# Patient Record
Sex: MALE | Employment: FULL TIME | URBAN - METROPOLITAN AREA
[De-identification: names, ages, dates, MRNs, and addresses within clinical notes are randomized per-mention and may not be internally consistent; named-entity substitution may affect disease eponyms.]

---

## 2020-11-03 ENCOUNTER — OFFICE VISIT (OUTPATIENT)
Dept: FAMILY MEDICINE | Facility: CLINIC | Age: 40
End: 2020-11-03
Payer: COMMERCIAL

## 2020-11-03 VITALS
OXYGEN SATURATION: 96 % | WEIGHT: 211.5 LBS | SYSTOLIC BLOOD PRESSURE: 104 MMHG | BODY MASS INDEX: 33.13 KG/M2 | DIASTOLIC BLOOD PRESSURE: 68 MMHG | TEMPERATURE: 99.2 F | RESPIRATION RATE: 14 BRPM | HEART RATE: 73 BPM

## 2020-11-03 DIAGNOSIS — Z13.6 CARDIOVASCULAR SCREENING; LDL GOAL LESS THAN 160: ICD-10-CM

## 2020-11-03 DIAGNOSIS — Z13.1 SCREENING FOR DIABETES MELLITUS: ICD-10-CM

## 2020-11-03 DIAGNOSIS — F43.21 GRIEF REACTION: Primary | ICD-10-CM

## 2020-11-03 DIAGNOSIS — Z80.7 FAMILY HISTORY OF LYMPHOMA: ICD-10-CM

## 2020-11-03 DIAGNOSIS — R06.01 ORTHOPNEA: ICD-10-CM

## 2020-11-03 PROCEDURE — 99204 OFFICE O/P NEW MOD 45 MIN: CPT | Performed by: PHYSICIAN ASSISTANT

## 2020-11-03 RX ORDER — ALBUTEROL SULFATE 90 UG/1
2 AEROSOL, METERED RESPIRATORY (INHALATION) EVERY 6 HOURS
COMMUNITY

## 2020-11-03 NOTE — PROGRESS NOTES
"Subjective     Bernardo Swain is a 39 year old male who presents to clinic today for the following health issues:    HPI         Concern - Sleep in general:   Onset: an issue 2 years.   Description: not sleeping well, pt states they get starttled from being woken up, or waking alone. \"almost like I gasp for air when I wake up.\" Others have also noticed, causing pt to come in and get started on what could be going on.   Progression of Symptoms:  Worsening- also experiencing heat burn.   Accompanying Signs & Symptoms: nothing   Previous history of similar problem: no previous evaluation or similar problem.        Improved by: has not tried much.   Therapies tried and outcome: nothing tried by pt.     Also with some snoring that disrupts others    Reports he had an unexpected death in the family as well  His older brother, he was closest to him. They were coworkers. Passed away in his sleep, they are still waiting on autopsy report    Going through a divorce     The last few weeks he's been waking up startled at night  In the past he was waking up because he gasps for air    He also gets strep but also still has his tonsils    Wakes up feeling really hot at night as well        Review of Systems   CONSTITUTIONAL: NEGATIVE for fever, chills, change in weight  INTEGUMENTARY/SKIN: NEGATIVE for worrisome rashes, moles or lesions  EYES: NEGATIVE for vision changes or irritation  ENT/MOUTH: NEGATIVE for ear, mouth and throat problems  RESP: NEGATIVE for significant cough or SOB  BREAST: NEGATIVE for masses, tenderness or discharge  CV: NEGATIVE for chest pain, palpitations or peripheral edema  GI: NEGATIVE for nausea, abdominal pain, heartburn, or change in bowel habits  : NEGATIVE for frequency, dysuria, or hematuria  MUSCULOSKELETAL: NEGATIVE for significant arthralgias or myalgia  NEURO: NEGATIVE for weakness, dizziness or paresthesias  ENDOCRINE: NEGATIVE for temperature intolerance, skin/hair changes  HEME: NEGATIVE " for bleeding problems  PSYCHIATRIC: NEGATIVE fordelusions, hallucinations, thoughts of hurting someone else and thoughts of self harm      Objective    /68   Pulse 73   Temp 99.2  F (37.3  C) (Temporal)   Resp 14   Wt 95.9 kg (211 lb 8 oz)   SpO2 96%   BMI 33.13 kg/m    Body mass index is 33.13 kg/m .  Physical Exam   GENERAL: healthy, alert and no distress  EYES: Eyes grossly normal to inspection, PERRL and conjunctivae and sclerae normal  HENT: ear canals and TM's normal, nose and mouth without ulcers or lesions  NECK: no adenopathy, no asymmetry, masses, or scars and thyroid normal to palpation  RESP: lungs clear to auscultation - no rales, rhonchi or wheezes  CV: regular rate and rhythm, normal S1 S2, no S3 or S4, no murmur, click or rub, no peripheral edema and peripheral pulses strong  MS: no gross musculoskeletal defects noted, no edema  SKIN: no suspicious lesions or rashes  NEURO: Normal strength and tone, mentation intact and speech normal  PSYCH: mentation appears normal, affect blunted              ICD-10-CM    1. Grief reaction  F43.21 MENTAL HEALTH REFERRAL  - Adult; Outpatient Treatment; Individual/Couples/Family/Group Therapy/Health Psychology; [facility]: [facility] [phone]; We will contact you to schedule the appointment or please call with any questions   2. Orthopnea  R06.01 SLEEP EVALUATION & MANAGEMENT REFERRAL - ADULT -[facility] [phone] (Age 15 and up)   3. CARDIOVASCULAR SCREENING; LDL GOAL LESS THAN 160  Z13.6 Lipid panel reflex to direct LDL Fasting   4. Family history of lymphoma  Z80.7 **CBC with platelets FUTURE anytime   5. Screening for diabetes mellitus  Z13.1 Glucose     Patient Instructions   [employer] Booking  Follow up with sleep psychologist  Schedule fasting labs  Return to clinic for any new or worsening symptoms or go to ER Urgent care in off hours

## 2020-11-03 NOTE — PATIENT INSTRUCTIONS
[email or web address]  Follow up with sleep psychologist  Schedule fasting labs  Return to clinic for any new or worsening symptoms or go to ER Urgent care in off hours

## 2020-11-04 RX ORDER — ALBUTEROL SULFATE 90 UG/1
2 AEROSOL, METERED RESPIRATORY (INHALATION)
COMMUNITY
Start: 2018-11-26

## 2020-11-04 RX ORDER — FLUTICASONE PROPIONATE 50 MCG
SPRAY, SUSPENSION (ML) NASAL
COMMUNITY
Start: 2020-04-24

## 2020-11-04 NOTE — PROGRESS NOTES
"Bernardo Swain is a 39 year old male who is being evaluated via a billable video visit.      The patient has been notified of following:     \"This video visit will be conducted via a call between you and your physician/provider. We have found that certain health care needs can be provided without the need for an in-person physical exam.  This service lets us provide the care you need with a video conversation.  If a prescription is necessary we can send it directly to your pharmacy.  If lab work is needed we can place an order for that and you can then stop by our lab to have the test done at a later time.    Video visits are billed at different rates depending on your insurance coverage.  Please reach out to your insurance provider with any questions.    If during the course of the call the physician/provider feels a video visit is not appropriate, you will not be charged for this service.\"    Patient has given verbal consent for Video visit? Yes  How would you like to obtain your AVS? Mail a copy  If you are dropped from the video visit, the video invite should be resent to: Text to cell phone: [phone]      Video-Visit Details    Type of service:  Video Visit    Video Start Time: 3:40 PM  Video End Time: 3:59 PM    Originating Location (pt. Location): Home    Distant Location (provider location):  [facility]     Platform used for Video Visit: Ivan Toth MD    Sleep Consultation:    Date on this visit: 11/5/2020    Bernardo Swain is sent by Beatris Aguirre for a sleep consultation regarding possible sleep apnea.    Primary Physician: No Ref-Primary, Physician     Chief complaint: snoring, gasping episodes in sleep     Presenting History:     Bernardo Swain reports nightly snoring and poor quality of sleep for more than 2 years.     Bernardo goes to sleep at 12:00 AM during the week. He wakes up at 7:00 AM without an alarm. He falls asleep in 15 minutes.  " Bernardo denies difficulty falling asleep.  He wakes up 1-2 times a night for 10 minutes before falling back to sleep.  Bernardo wakes up to uncertain reasons and snort arousals.  On weekends, Bernardo goes to sleep at 12:00 AM.  He wakes up at 7:00 AM without an alarm. He falls asleep in 15 minutes.  Patient gets an average of 7 hours of sleep per night.     Bernardo does snore every night. Patient does not have a regular bed partner. There is report of snoring, gasping, snorting and poor quality of sleep.  He does have witnessed apneas. Patient sleeps on his side and stomach. He has occasional morning headaches, denies no restless legs. Bernardo denies any bruxism, sleep walking, sleep talking, dream enactment, sleep paralysis, cataplexy and hypnogogic/hypnopompic hallucinations.    Patient's [place] Sleepiness score 8/24 consistent with no daytime sleepiness.      Bernardo naps 0-1 times per week for 20-30 minutes, feels refreshed after naps. He takes no inadvertant naps.  He denies closing eyes, dozing and falling asleep while driving. Patient was counseled on the importance of driving while alert, to pull over if drowsy, or nap before getting into the vehicle if sleepy.      He uses 2 cups/day of coffee. Last caffeine intake is usually before noon.    Allergies:    Allergies   Allergen Reactions     Latex Itching     Seasonal Allergies        Medications:    Current Outpatient Medications   Medication Sig Dispense Refill     albuterol (PROAIR HFA/PROVENTIL HFA/VENTOLIN HFA) 108 (90 Base) MCG/ACT inhaler Inhale 2 puffs into the lungs       albuterol (PROAIR HFA/PROVENTIL HFA/VENTOLIN HFA) 108 (90 Base) MCG/ACT inhaler Inhale 2 puffs into the lungs every 6 hours       fluticasone (FLONASE) 50 MCG/ACT nasal spray        Fluticasone Propionate (FLONASE NA)        mometasone-formoterol (DULERA) 200-5 MCG/ACT oral inhaler          Problem List:  Patient Active Problem List    Diagnosis Date Noted     Chronic pain of right  knee 08/04/2016     Priority: Medium        Past Medical/Surgical History:  Past Medical History:   Diagnosis Date     Heart burn      Uncomplicated asthma      No past surgical history on file.    Social History:  Social History     Socioeconomic History     Marital status: Single     Spouse name: Not on file     Number of children: Not on file     Years of education: Not on file     Highest education level: Not on file   Occupational History     Not on file   Social Needs     Financial resource strain: Not on file     Food insecurity     Worry: Not on file     Inability: Not on file     Transportation needs     Medical: Not on file     Non-medical: Not on file   Tobacco Use     Smoking status: Never Smoker     Smokeless tobacco: Current User     Types: Chew     Tobacco comment: OCCAS/ONCE A YEAR   Substance and Sexual Activity     Alcohol use: Yes     Comment: 1 drink a day      Drug use: Never     Sexual activity: Yes     Partners: Female   Lifestyle     Physical activity     Days per week: Not on file     Minutes per session: Not on file     Stress: Not on file   Relationships     Social connections     Talks on phone: Not on file     Gets together: Not on file     Attends Denominational service: Not on file     Active member of club or organization: Not on file     Attends meetings of clubs or organizations: Not on file     Relationship status: Not on file     Intimate partner violence     Fear of current or ex partner: Not on file     Emotionally abused: Not on file     Physically abused: Not on file     Forced sexual activity: Not on file   Other Topics Concern     Not on file   Social History Narrative     Not on file       Family History:  Family History   Problem Relation Age of Onset     Lymphoma Mother      Lymphoma Father        Review of Systems:  A complete review of systems reviewed by me is negative with the exeption of what has been mentioned in the history of present illness.  CONSTITUTIONAL:  POSITIVE  for  night sweats  EYES: NEGATIVE for changes in vision, blind spots, double vision.  ENT: NEGATIVE for ear pain, sore throat, sinus pain, post-nasal drip, runny nose, bloody nose  CARDIAC: NEGATIVE for fast heartbeats or fluttering in chest, chest pain or pressure, breathlessness when lying flat, swollen legs or swollen feet.  NEUROLOGIC: NEGATIVE headaches, weakness or numbness in the arms or legs.  DERMATOLOGIC: NEGATIVE for rashes, new moles or change in mole(s)  PULMONARY: NEGATIVE SOB at rest, SOB with activity, dry cough, productive cough, coughing up blood, wheezing or whistling when breathing.    GASTROINTESTINAL: NEGATIVE for nausea or vomitting, loose or watery stools, fat or grease in stools, constipation, abdominal pain, bowel movements black in color or blood noted.  GENITOURINARY: NEGATIVE for pain during urination, blood in urine, urinating more frequently than usual, irregular menstrual periods.  MUSCULOSKELETAL:  POSITIVE for  muscle pain  ENDOCRINE: NEGATIVE for increased thirst or urination, diabetes.  LYMPHATIC: NEGATIVE for swollen lymph nodes, lumps or bumps in the breasts or nipple discharge.    Physical Examination:  Vitals: There were no vitals taken for this visit.  BMI= There is no height or weight on file to calculate BMI.         [place] Total Score 11/4/2020   Total score - [place] 8       JOSE Total Score: 12 (11/04/20 1059)    GENERAL APPEARANCE: healthy, alert, active and no distress  EYES: Eyes grossly normal to inspection  HENT: Normocephalic   RESP: No cough, no dyspnea   MS: extremities normal- no gross deformities noted  SKIN: no suspicious lesions or rashes  NEURO: mentation intact and speech normal  PSYCH: mentation appears normal and affect normal/bright      Impression/Plan:    1. Probable Obstructive sleep apnea    Patient is a 39-year-old male, who presents with history of loud snoring, witnessed apneas, disrupted sleep and daytime fatigue.  There is a high risk for  obstructive sleep apnea and an overnight sleep study is recommended for further assessment.    Plan:     1. Home sleep apnea testing      Literature provided regarding sleep apnea.      He will follow up with me in approximately two weeks after his sleep study has been competed to review the results and discuss plan of care.       Polysomnography & HST reviewed.  Obstructive sleep apnea reviewed.  Complications of untreated sleep apnea were reviewed.    Dr. Iraj Toth     CC: Beatris Calderon Sto*

## 2020-11-05 ENCOUNTER — VIRTUAL VISIT (OUTPATIENT)
Dept: SLEEP MEDICINE | Facility: CLINIC | Age: 40
End: 2020-11-05
Payer: COMMERCIAL

## 2020-11-05 DIAGNOSIS — G47.30 OBSERVED SLEEP APNEA: Primary | ICD-10-CM

## 2020-11-05 DIAGNOSIS — R53.82 CHRONIC FATIGUE, UNSPECIFIED: ICD-10-CM

## 2020-11-05 DIAGNOSIS — R06.83 SNORING: ICD-10-CM

## 2020-11-05 PROCEDURE — 99204 OFFICE O/P NEW MOD 45 MIN: CPT | Mod: GT | Performed by: INTERNAL MEDICINE

## 2020-12-13 ENCOUNTER — HEALTH MAINTENANCE LETTER (OUTPATIENT)
Age: 40
End: 2020-12-13

## 2021-01-20 ENCOUNTER — OFFICE VISIT (OUTPATIENT)
Dept: SLEEP MEDICINE | Facility: CLINIC | Age: 41
End: 2021-01-20
Payer: COMMERCIAL

## 2021-01-20 DIAGNOSIS — R53.82 CHRONIC FATIGUE, UNSPECIFIED: ICD-10-CM

## 2021-01-20 DIAGNOSIS — G47.30 OBSERVED SLEEP APNEA: ICD-10-CM

## 2021-01-20 DIAGNOSIS — G47.33 OSA (OBSTRUCTIVE SLEEP APNEA): ICD-10-CM

## 2021-01-20 DIAGNOSIS — R06.83 SNORING: ICD-10-CM

## 2021-01-20 PROCEDURE — G0399 HOME SLEEP TEST/TYPE 3 PORTA: HCPCS | Performed by: INTERNAL MEDICINE

## 2021-01-21 ENCOUNTER — DOCUMENTATION ONLY (OUTPATIENT)
Dept: SLEEP MEDICINE | Facility: CLINIC | Age: 41
End: 2021-01-21
Payer: COMMERCIAL

## 2021-01-21 PROBLEM — G47.33 OSA (OBSTRUCTIVE SLEEP APNEA): Status: ACTIVE | Noted: 2021-01-21

## 2021-01-21 NOTE — PROGRESS NOTES
Report to JOEY Fernandez. Patient to be transported to [facility] via wheelchair.    This HSAT was performed using a Noxturnal T3 device which recorded snore, sound, movement activity, body position, nasal pressure, oronasal thermal airflow, pulse, oximetry and both chest and abdominal respiratory effort. HSAT data was restricted to the time patient states they were in bed.     HSAT was scored using 1B 4% hypopnea rule.     AHI: 21.0. Snoring was reported as loud.  Time with SpO2 below 89% was 39.3 minutes.   Overall signal quality was good     Pt will follow up with sleep provider to determine appropriate therapy.     Ordering Provider, Iraj Toth MD Charles O. BA, [facility], RST System Clinical Specialist 1/21/2021

## 2021-01-21 NOTE — PROGRESS NOTES
HST POST-STUDY QUESTIONNAIRE    1. What time did you go to bed?  1205 am  2. How long do you think it took to fall asleep?  10 min  3. What time did you wake up to start the day?  0715  4. Did you get up during the night at all?  YEs  5. If you woke up, do you remember approximately what time(s)? 0300, 0500, 0600  6. Did you have any difficulty with the equipment?  No  7. Did you us any type of treatment with this study?  None  8. Was the head of the bed elevated? No  9. Did you sleep in a recliner?  No  10. Did you stop using CPAP at least 3 days before this test?  NA  11. Any other information you'd like us to know? none

## 2021-01-21 NOTE — PROGRESS NOTES
Pt returned HST device. It was downloaded and forwarded data to the clinical specialist for scoring.  .

## 2021-01-22 NOTE — PROCEDURES
"HOME SLEEP STUDY INTERPRETATION    Patient: Bernardo Swain  MRN: [id number]  YOB: 1980  Study Date: 2021  Referring Provider: No Ref-Primary, Physician;   Ordering Provider: Iraj Toth MD, MD     Indications for Home Study: Bernardo Swain is a 40 year old male with a history of fatigue who presents with symptoms suggestive of obstructive sleep apnea.    Estimated body mass index is 33.13 kg/m  as calculated from the following:    Height as of 16: 1.702 m (5' 7\").    Weight as of 11/3/20: 95.9 kg (211 lb 8 oz).  Total score - [place]: 8 (2020 10:58 AM)  STOP-BAN/8    Data: A full night home sleep study was performed recording the standard physiologic parameters including body position, movement, sound, nasal pressure, thermal oral airflow, chest and abdominal movements with respiratory inductance plethysmography, and oxygen saturation by pulse oximetry. Pulse rate was estimated by oximetry recording. This study was considered adequate based on > 4 hours of quality oximetry and respiratory recording. As specified by the AASM Manual for the Scoring of Sleep and Associated events, version 2.3, Rule VIII.D 1B, 4% oxygen desaturation scoring for hypopneas is used as a standard of care on all home sleep apnea testing.    Analysis Time:  487.9 minutes    Respiration:   Sleep Associated Hypoxemia: sustained hypoxemia was present. Baseline oxygen saturation was 99.7%.  Time with saturation less than or equal to 88% was 16.9 minutes. The lowest oxygen saturation was 77%.   Snoring: Snoring was present.  Respiratory events: The home study revealed a presence of 42 obstructive apneas and 26 mixed and central apneas. There were 103 hypopneas resulting in a combined apnea/hypopnea index [AHI] of 21 events per hour.  AHI was 26.4 per hour supine, - per hour prone, 14.9 per hour on left side, and 7.9 per hour on right side.   Pattern: Excluding events noted above, respiratory rate and pattern " was Normal.    Position: Percent of time spent: supine - 62%, prone - 0%, on left - 23.9%, on right - 14%.    Heart Rate: By pulse oximetry normal rate was noted.     Assessment:   Moderate obstructive sleep apnea.  Sleep associated hypoxemia was present.    Recommendations:  Consider auto-CPAP at 5-15 cmH2O or oral appliance therapy.  Weight management.    Diagnosis Code(s): Obstructive Sleep Apnea G47.33, Hypoxemia G47.36    Iraj Toth MD, MD, January 21, 2021   Diplomate, American Board of Psychiatry and Neurology, Sleep Medicine

## 2021-02-03 ENCOUNTER — VIRTUAL VISIT (OUTPATIENT)
Dept: SLEEP MEDICINE | Facility: CLINIC | Age: 41
End: 2021-02-03
Payer: COMMERCIAL

## 2021-02-03 DIAGNOSIS — G47.33 OSA (OBSTRUCTIVE SLEEP APNEA): Primary | ICD-10-CM

## 2021-02-03 PROCEDURE — 99214 OFFICE O/P EST MOD 30 MIN: CPT | Mod: GT | Performed by: INTERNAL MEDICINE

## 2021-02-03 NOTE — PROGRESS NOTES
Gio is a 40 year old who is being evaluated via a billable video visit.      Video Start Time: 10:42 am  Video-Visit Details    Type of service:  Video Visit    Video End Time:11:01 AM    Originating Location (pt. Location): Home    Distant Location (provider location):  [facility]     Platform used for Video Visit: Doximity       Sleep Study Follow-Up Visit:    Date on this visit: 2/3/2021    Bernardo Swain comes in today for follow-up of his home sleep study done on 1/20/21 at the [facility] for possible sleep apnea.    Respiratory events: The home study revealed a presence of 42 obstructive apneas and 26 mixed and central apneas. There were 103 hypopneas resulting in a combined apnea/hypopnea index [AHI] of 21 events per hour.  AHI was 26.4 per hour supine, - per hour prone, 14.9 per hour on left side, and 7.9 per hour on right side.   Pattern: Excluding events noted above, respiratory rate and pattern was Normal.    Sleep Associated Hypoxemia: sustained hypoxemia was present. Baseline oxygen saturation was 99.7%.  Time with saturation less than or equal to 88% was 16.9 minutes. The lowest oxygen saturation was 77%.   Snoring: Snoring was present.     Position: Percent of time spent: supine - 62%, prone - 0%, on left - 23.9%, on right - 14%.     Heart Rate: By pulse oximetry normal rate was noted.      Assessment:   Moderate obstructive sleep apnea.  Sleep associated hypoxemia was present.    These findings were reviewed with patient.     Past medical/surgical history, family history, social history, medications and allergies were reviewed.      Problem List:  Patient Active Problem List    Diagnosis Date Noted     HUI (obstructive sleep apnea) 01/21/2021     Priority: Medium     Moderate HUI       Chronic pain of right knee 08/04/2016     Priority: Medium        Impression/Plan:    1. Obstructive sleep apnea    HST result was reviewed in detail. We discussed  moderate sleep apnea, consequences and management options. We discussed CPAP therapy, dental appliance and upper airway surgery and compared these options. Patient opts for oral appliance therapy.     Plan:     1. Dental sleep medicine referral     He will follow up with me in about 9 month(s).     I spent a total of 30 minutes for this appointment today which include time spent before, during and after the visit for patient care, counseling and coordination of care.      Dr. Iraj Toth     CC: No Ref-Primary, Physician

## 2021-09-26 ENCOUNTER — HEALTH MAINTENANCE LETTER (OUTPATIENT)
Age: 41
End: 2021-09-26

## 2022-01-16 ENCOUNTER — HEALTH MAINTENANCE LETTER (OUTPATIENT)
Age: 42
End: 2022-01-16

## 2023-01-14 ENCOUNTER — HEALTH MAINTENANCE LETTER (OUTPATIENT)
Age: 43
End: 2023-01-14

## 2023-04-23 ENCOUNTER — HEALTH MAINTENANCE LETTER (OUTPATIENT)
Age: 43
End: 2023-04-23